# Patient Record
Sex: FEMALE | Race: OTHER | HISPANIC OR LATINO | URBAN - METROPOLITAN AREA
[De-identification: names, ages, dates, MRNs, and addresses within clinical notes are randomized per-mention and may not be internally consistent; named-entity substitution may affect disease eponyms.]

---
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
## 2024-07-03 PROBLEM — Z78.9 OTHER SPECIFIED HEALTH STATUS: Chronic | Status: ACTIVE | Noted: 2022-10-10

## 2024-09-24 ENCOUNTER — OUTPATIENT (OUTPATIENT)
Dept: OUTPATIENT SERVICES | Facility: HOSPITAL | Age: 24
LOS: 1 days | End: 2024-09-24
Payer: MEDICAID

## 2024-09-24 DIAGNOSIS — Z98.891 HISTORY OF UTERINE SCAR FROM PREVIOUS SURGERY: Chronic | ICD-10-CM

## 2024-09-24 DIAGNOSIS — Z01.818 ENCOUNTER FOR OTHER PREPROCEDURAL EXAMINATION: ICD-10-CM

## 2024-09-24 DIAGNOSIS — Z3A.38 38 WEEKS GESTATION OF PREGNANCY: ICD-10-CM

## 2024-09-24 LAB
BASOPHILS # BLD AUTO: 0.02 K/UL — SIGNIFICANT CHANGE UP (ref 0–0.2)
BASOPHILS NFR BLD AUTO: 0.2 % — SIGNIFICANT CHANGE UP (ref 0–2)
EOSINOPHIL # BLD AUTO: 0.06 K/UL — SIGNIFICANT CHANGE UP (ref 0–0.5)
EOSINOPHIL NFR BLD AUTO: 0.6 % — SIGNIFICANT CHANGE UP (ref 0–6)
HCT VFR BLD CALC: 35.2 % — SIGNIFICANT CHANGE UP (ref 34.5–45)
HGB BLD-MCNC: 11.3 G/DL — LOW (ref 11.5–15.5)
IMM GRANULOCYTES NFR BLD AUTO: 0.4 % — SIGNIFICANT CHANGE UP (ref 0–0.9)
LYMPHOCYTES # BLD AUTO: 1.24 K/UL — SIGNIFICANT CHANGE UP (ref 1–3.3)
LYMPHOCYTES # BLD AUTO: 13 % — SIGNIFICANT CHANGE UP (ref 13–44)
MCHC RBC-ENTMCNC: 26.5 PG — LOW (ref 27–34)
MCHC RBC-ENTMCNC: 32.1 GM/DL — SIGNIFICANT CHANGE UP (ref 32–36)
MCV RBC AUTO: 82.4 FL — SIGNIFICANT CHANGE UP (ref 80–100)
MONOCYTES # BLD AUTO: 0.51 K/UL — SIGNIFICANT CHANGE UP (ref 0–0.9)
MONOCYTES NFR BLD AUTO: 5.3 % — SIGNIFICANT CHANGE UP (ref 2–14)
NEUTROPHILS # BLD AUTO: 7.67 K/UL — HIGH (ref 1.8–7.4)
NEUTROPHILS NFR BLD AUTO: 80.5 % — HIGH (ref 43–77)
PLATELET # BLD AUTO: 303 K/UL — SIGNIFICANT CHANGE UP (ref 150–400)
RBC # BLD: 4.27 M/UL — SIGNIFICANT CHANGE UP (ref 3.8–5.2)
RBC # FLD: 15 % — HIGH (ref 10.3–14.5)
WBC # BLD: 9.54 K/UL — SIGNIFICANT CHANGE UP (ref 3.8–10.5)
WBC # FLD AUTO: 9.54 K/UL — SIGNIFICANT CHANGE UP (ref 3.8–10.5)

## 2024-09-24 PROCEDURE — 86900 BLOOD TYPING SEROLOGIC ABO: CPT

## 2024-09-24 PROCEDURE — 36415 COLL VENOUS BLD VENIPUNCTURE: CPT

## 2024-09-24 PROCEDURE — 86901 BLOOD TYPING SEROLOGIC RH(D): CPT

## 2024-09-24 PROCEDURE — 85025 COMPLETE CBC W/AUTO DIFF WBC: CPT

## 2024-09-24 PROCEDURE — 86850 RBC ANTIBODY SCREEN: CPT

## 2024-09-25 DIAGNOSIS — Z3A.38 38 WEEKS GESTATION OF PREGNANCY: ICD-10-CM

## 2024-09-25 DIAGNOSIS — Z01.818 ENCOUNTER FOR OTHER PREPROCEDURAL EXAMINATION: ICD-10-CM

## 2024-09-26 ENCOUNTER — INPATIENT (INPATIENT)
Facility: HOSPITAL | Age: 24
LOS: 2 days | Discharge: HOME | DRG: 540 | End: 2024-09-29
Attending: OBSTETRICS & GYNECOLOGY | Admitting: OBSTETRICS & GYNECOLOGY
Payer: MEDICAID

## 2024-09-26 VITALS
SYSTOLIC BLOOD PRESSURE: 124 MMHG | RESPIRATION RATE: 18 BRPM | HEIGHT: 67 IN | HEART RATE: 94 BPM | TEMPERATURE: 98 F | WEIGHT: 278 LBS | DIASTOLIC BLOOD PRESSURE: 79 MMHG

## 2024-09-26 DIAGNOSIS — Z3A.38 38 WEEKS GESTATION OF PREGNANCY: ICD-10-CM

## 2024-09-26 DIAGNOSIS — Z98.891 HISTORY OF UTERINE SCAR FROM PREVIOUS SURGERY: Chronic | ICD-10-CM

## 2024-09-26 DIAGNOSIS — Z01.818 ENCOUNTER FOR OTHER PREPROCEDURAL EXAMINATION: ICD-10-CM

## 2024-09-26 LAB — T PALLIDUM AB TITR SER: NEGATIVE — SIGNIFICANT CHANGE UP

## 2024-09-26 PROCEDURE — 94760 N-INVAS EAR/PLS OXIMETRY 1: CPT

## 2024-09-26 PROCEDURE — 85025 COMPLETE CBC W/AUTO DIFF WBC: CPT

## 2024-09-26 PROCEDURE — 36415 COLL VENOUS BLD VENIPUNCTURE: CPT

## 2024-09-26 PROCEDURE — 88302 TISSUE EXAM BY PATHOLOGIST: CPT

## 2024-09-26 PROCEDURE — 59050 FETAL MONITOR W/REPORT: CPT

## 2024-09-26 PROCEDURE — 88302 TISSUE EXAM BY PATHOLOGIST: CPT | Mod: 26

## 2024-09-26 PROCEDURE — 86780 TREPONEMA PALLIDUM: CPT

## 2024-09-26 PROCEDURE — 12345F: CUSTOM | Mod: NC

## 2024-09-26 RX ORDER — OXYCODONE HYDROCHLORIDE 30 MG/1
5 TABLET, FILM COATED, EXTENDED RELEASE ORAL
Refills: 0 | Status: COMPLETED | OUTPATIENT
Start: 2024-09-26 | End: 2024-10-03

## 2024-09-26 RX ORDER — SODIUM CITRATE AND CITRIC ACID MONOHYDRATE 334; 500 MG/5ML; MG/5ML
30 SOLUTION ORAL ONCE
Refills: 0 | Status: COMPLETED | OUTPATIENT
Start: 2024-09-26 | End: 2024-09-26

## 2024-09-26 RX ORDER — OXYTOCIN/RINGER'S LACTATE 20/500ML
167 PLASTIC BAG, INJECTION (ML) INTRAVENOUS
Qty: 30 | Refills: 0 | Status: DISCONTINUED | OUTPATIENT
Start: 2024-09-26 | End: 2024-09-29

## 2024-09-26 RX ORDER — TETANUS TOXOID, REDUCED DIPHTHERIA TOXOID AND ACELLULAR PERTUSSIS VACCINE, ADSORBED 5; 2.5; 8; 8; 2.5 [IU]/.5ML; [IU]/.5ML; UG/.5ML; UG/.5ML; UG/.5ML
0.5 SUSPENSION INTRAMUSCULAR ONCE
Refills: 0 | Status: DISCONTINUED | OUTPATIENT
Start: 2024-09-26 | End: 2024-09-29

## 2024-09-26 RX ORDER — ACETAMINOPHEN 325 MG
1000 TABLET ORAL ONCE
Refills: 0 | Status: COMPLETED | OUTPATIENT
Start: 2024-09-26 | End: 2024-09-26

## 2024-09-26 RX ORDER — DIPHENHYDRAMINE HCL 12.5MG/5ML
25 LIQUID (ML) ORAL EVERY 6 HOURS
Refills: 0 | Status: DISCONTINUED | OUTPATIENT
Start: 2024-09-26 | End: 2024-09-29

## 2024-09-26 RX ORDER — FAMOTIDINE 40 MG
20 TABLET ORAL ONCE
Refills: 0 | Status: COMPLETED | OUTPATIENT
Start: 2024-09-26 | End: 2024-09-26

## 2024-09-26 RX ORDER — CHLORHEXIDINE GLUCONATE ORAL RINSE 1.2 MG/ML
1 SOLUTION DENTAL DAILY
Refills: 0 | Status: DISCONTINUED | OUTPATIENT
Start: 2024-09-26 | End: 2024-09-26

## 2024-09-26 RX ORDER — ACETAMINOPHEN 325 MG
975 TABLET ORAL
Refills: 0 | Status: DISCONTINUED | OUTPATIENT
Start: 2024-09-26 | End: 2024-09-29

## 2024-09-26 RX ORDER — OXYTOCIN/RINGER'S LACTATE 20/500ML
42 PLASTIC BAG, INJECTION (ML) INTRAVENOUS
Qty: 30 | Refills: 0 | Status: DISCONTINUED | OUTPATIENT
Start: 2024-09-26 | End: 2024-09-29

## 2024-09-26 RX ORDER — SODIUM CHLORIDE IRRIG SOLUTION 0.9 %
1000 SOLUTION, IRRIGATION IRRIGATION
Refills: 0 | Status: DISCONTINUED | OUTPATIENT
Start: 2024-09-26 | End: 2024-09-29

## 2024-09-26 RX ORDER — ENOXAPARIN SODIUM 150 MG/ML
40 INJECTION SUBCUTANEOUS EVERY 24 HOURS
Refills: 0 | Status: DISCONTINUED | OUTPATIENT
Start: 2024-09-26 | End: 2024-09-29

## 2024-09-26 RX ORDER — KETOROLAC TROMETHAMINE 10 MG/1
30 TABLET, FILM COATED ORAL EVERY 6 HOURS
Refills: 0 | Status: DISCONTINUED | OUTPATIENT
Start: 2024-09-26 | End: 2024-09-27

## 2024-09-26 RX ORDER — SODIUM CHLORIDE IRRIG SOLUTION 0.9 %
1000 SOLUTION, IRRIGATION IRRIGATION
Refills: 0 | Status: DISCONTINUED | OUTPATIENT
Start: 2024-09-26 | End: 2024-09-26

## 2024-09-26 RX ORDER — MAGNESIUM HYDROXIDE 400 MG/5ML
30 SUSPENSION, ORAL (FINAL DOSE FORM) ORAL
Refills: 0 | Status: DISCONTINUED | OUTPATIENT
Start: 2024-09-26 | End: 2024-09-29

## 2024-09-26 RX ORDER — OXYCODONE HYDROCHLORIDE 30 MG/1
5 TABLET, FILM COATED, EXTENDED RELEASE ORAL ONCE
Refills: 0 | Status: DISCONTINUED | OUTPATIENT
Start: 2024-09-26 | End: 2024-09-29

## 2024-09-26 RX ADMIN — Medication 975 MILLIGRAM(S): at 18:30

## 2024-09-26 RX ADMIN — SODIUM CITRATE AND CITRIC ACID MONOHYDRATE 30 MILLILITER(S): 334; 500 SOLUTION ORAL at 07:41

## 2024-09-26 RX ADMIN — KETOROLAC TROMETHAMINE 30 MILLIGRAM(S): 10 TABLET, FILM COATED ORAL at 15:53

## 2024-09-26 RX ADMIN — Medication 200 MILLIGRAM(S): at 09:23

## 2024-09-26 RX ADMIN — ENOXAPARIN SODIUM 40 MILLIGRAM(S): 150 INJECTION SUBCUTANEOUS at 22:00

## 2024-09-26 RX ADMIN — Medication 975 MILLIGRAM(S): at 20:00

## 2024-09-26 RX ADMIN — Medication 42 MILLIUNIT(S)/MIN: at 10:53

## 2024-09-26 RX ADMIN — Medication 400 MILLIGRAM(S): at 11:56

## 2024-09-26 RX ADMIN — KETOROLAC TROMETHAMINE 30 MILLIGRAM(S): 10 TABLET, FILM COATED ORAL at 17:14

## 2024-09-26 RX ADMIN — Medication 20 MILLIGRAM(S): at 07:40

## 2024-09-26 NOTE — OB PROVIDER DELIVERY SUMMARY - NSSELHIDDEN_OBGYN_ALL_OB_FT
[NS_DeliveryAttending1_OBGYN_ALL_OB_FT:[id number]==],[NS_DeliveryAssist1_OBGYN_ALL_OB_FT:[id number]=],[NS_DeliveryRN_OBGYN_ALL_OB_FT:[id number]=]

## 2024-09-26 NOTE — OB RN DELIVERY SUMMARY - NSSELHIDDEN_OBGYN_ALL_OB_FT
[NS_DeliveryAttending1_OBGYN_ALL_OB_FT:[id number]==],[NS_DeliveryAssist1_OBGYN_ALL_OB_FT:[id number]=],[NS_DeliveryRN_OBGYN_ALL_OB_FT:[id number]=]

## 2024-09-26 NOTE — OB RN PATIENT PROFILE - DOES PATIENT HAVE ADVANCE DIRECTIVE
Message left for patient regarding her Pap LSIL results.  She will need a colposcopy.  She does not have any follow up OB appointments scheduled with us.  Will route to PSRs to call.   No

## 2024-09-26 NOTE — OB RN DELIVERY SUMMARY - NS_EXTRAMURALDEL_OBGYN_ALL_OB
Eliseo Ferreira,   Your TSH is low normal. If you feel good let's continue same dose, I will send refills. But let's recheck in 3 months, lab only. I want to make sure we are not suppressing your thyroid. Kidney function looks good.   Thank you,  FARIBA Maloney, NP-C  [facility]  
No

## 2024-09-26 NOTE — OB RN INTRAOPERATIVE NOTE - NSSELHIDDEN_OBGYN_ALL_OB_FT
[NS_DeliveryAttending1_OBGYN_ALL_OB_FT:[id number]==],[NS_DeliveryAssist1_OBGYN_ALL_OB_FT:[id number]=],[NS_DeliveryRN_OBGYN_ALL_OB_FT:[id number]=]

## 2024-09-26 NOTE — OB RN PATIENT PROFILE - VISION (WITH CORRECTIVE LENSES IF THE PATIENT USUALLY WEARS THEM):
pt wearing contacts/Severely impaired: cannot locate objects without hearing or touching them or patient nonresponsive.

## 2024-09-26 NOTE — OB PROVIDER H&P - ATTENDING COMMENTS
Pt seen and examined. Agree with note above.  Pt desires permanent sterilization with partial or full salpingectomy.  Pt understands potential of complications related to adhesive disease during c/section and also possible inability to perform salpingectomy.  All r / b / a disc with the patient.  Consents signed and all questions answered.    MERVIN

## 2024-09-26 NOTE — OB PROVIDER DELIVERY SUMMARY - NSPROVIDERDELIVERYNOTE_OBGYN_ALL_OB_FT
Brief  Delivery Summary    Procedure: rLTCS +BS for prior cs  Findings: Viable male infant, apgars 9/9, cephalic presentation. Grossly normal uterus, fallopian tubes and ovaries.  Fallopian tubes removed in full  Single layer uterine closure with monocryl  subcutaneous with vicryl  SubQ skin closure with monocryl  EBL: 400  UOP: 200  Fluids in OR: 2L  Complications: None

## 2024-09-26 NOTE — OB PROVIDER H&P - ASSESSMENT
A/P: 23y  at 39w1d GA by LMP consistent with 1st trimester sono who presents to L&D for repeat c/s + BS.  -Admit to L&D  -Consent  -Admission labs  -NPO  -IV fluids  -rCS: tubal consent signed in clinic. 2 U on hold, 2IV, TXA pre op  -Fetus: reactive tracing. Continuous toco and fetal monitoring.   -GBS: Negative, no GBS ppx required   -Analgesia: spinal in OR    Discussed with Dr. Pugh

## 2024-09-26 NOTE — OB RN DELIVERY SUMMARY - NS_SEPSISRSKCALC_OBGYN_ALL_OB_FT
EOS calculated successfully. EOS Risk Factor: 0.5/1000 live births (Formerly named [facility] national incidence); GA=39w1d; Temp=98.2; ROM=0.017; GBS='Negative'; Antibiotics='No antibiotics or any antibiotics < 2 hrs prior to birth'

## 2024-09-26 NOTE — OB RN PATIENT PROFILE - BABY SUPPLIES, OB PROFILE
----- Message from Lily Phan sent at 5/30/2017  7:34 AM CDT -----  Contact: eliane Prajapati earlier appt   Teaches  tues and francescaurs   10 to 11  appt was changed   Call back       Pt contacted & appointment rescheduled   
none

## 2024-09-26 NOTE — OB PROVIDER H&P - HISTORY OF PRESENT ILLNESS
23y  at 39w1d GA by LMP consistent with 1st trimester sono who presents to L&D for repeat c/s + BS. Patient denies vaginal bleeding, contractions and leakage of fluid. She endorses good fetal movement. Denies fevers, chills, nausea, vomiting, chest pain, SOB, dizziness and headache. No other complaints at this time.   JONATHAN: 22  LMP: 22    Prenatal course is significant for:  prior c/s x3  NIPS + DiGeorge syndrome, declined amniocentesis, normal fetal MRI and echo    POB:  c/s x3  PGYN: -fibroids, -ovarian cysts, denies STD hx, denies abnormal PAPs   PMH: obesity  PSH: c/s x3  SH: Denies EtOH, tobacco and illicit drug use during this pregnancy; feels safe at home   Meds: PNVs, iron  Allergies: NKDA    Sono: vertex, anterior placenta  EFW: 3600    T(C): 36.8 (24 @ 05:56), Max: 36.8 (24 @ 05:56)  HR: 94 (24 @ 05:56) (94 - 94)  BP: 124/79 (24 @ 05:56) (124/79 - 124/79)  RR: 18 (24 @ 05:56) (18 - 18)  SpO2: --    Gen: NAD, well-appearing, AAOx3   Abd: Soft, gravid  Ext: non-tender, non-edematous  SSE: defer  SVE:  defer  Bedside sono: vertex  FHT: baseline 130, moderate variability, +accels, -decels   [place]: no ctx seen

## 2024-09-26 NOTE — OB RN PATIENT PROFILE - NS_SOURCEOFINFO_OBGYN_ALL_OB
Patient's antihypertensives were discontinued after previous hospital stay due to normotension    · Continue to monitor  · Will continue midodrine for blood pressure support Patient

## 2024-09-27 LAB
BASOPHILS # BLD AUTO: 0.04 K/UL — SIGNIFICANT CHANGE UP (ref 0–0.2)
BASOPHILS NFR BLD AUTO: 0.4 % — SIGNIFICANT CHANGE UP (ref 0–2)
EOSINOPHIL # BLD AUTO: 0.03 K/UL — SIGNIFICANT CHANGE UP (ref 0–0.5)
EOSINOPHIL NFR BLD AUTO: 0.3 % — SIGNIFICANT CHANGE UP (ref 0–6)
HCT VFR BLD CALC: 30.4 % — LOW (ref 34.5–45)
HGB BLD-MCNC: 9.8 G/DL — LOW (ref 11.5–15.5)
IMM GRANULOCYTES NFR BLD AUTO: 0.5 % — SIGNIFICANT CHANGE UP (ref 0–0.9)
LYMPHOCYTES # BLD AUTO: 1.56 K/UL — SIGNIFICANT CHANGE UP (ref 1–3.3)
LYMPHOCYTES # BLD AUTO: 15.1 % — SIGNIFICANT CHANGE UP (ref 13–44)
MCHC RBC-ENTMCNC: 26.6 PG — LOW (ref 27–34)
MCHC RBC-ENTMCNC: 32.2 GM/DL — SIGNIFICANT CHANGE UP (ref 32–36)
MCV RBC AUTO: 82.6 FL — SIGNIFICANT CHANGE UP (ref 80–100)
MONOCYTES # BLD AUTO: 0.69 K/UL — SIGNIFICANT CHANGE UP (ref 0–0.9)
MONOCYTES NFR BLD AUTO: 6.7 % — SIGNIFICANT CHANGE UP (ref 2–14)
NEUTROPHILS # BLD AUTO: 7.95 K/UL — HIGH (ref 1.8–7.4)
NEUTROPHILS NFR BLD AUTO: 77 % — SIGNIFICANT CHANGE UP (ref 43–77)
PLATELET # BLD AUTO: 272 K/UL — SIGNIFICANT CHANGE UP (ref 150–400)
RBC # BLD: 3.68 M/UL — LOW (ref 3.8–5.2)
RBC # FLD: 14.9 % — HIGH (ref 10.3–14.5)
WBC # BLD: 10.32 K/UL — SIGNIFICANT CHANGE UP (ref 3.8–10.5)
WBC # FLD AUTO: 10.32 K/UL — SIGNIFICANT CHANGE UP (ref 3.8–10.5)

## 2024-09-27 RX ORDER — ACETAMINOPHEN 325 MG
2 TABLET ORAL
Qty: 112 | Refills: 0
Start: 2024-09-27 | End: 2024-10-10

## 2024-09-27 RX ADMIN — KETOROLAC TROMETHAMINE 30 MILLIGRAM(S): 10 TABLET, FILM COATED ORAL at 07:00

## 2024-09-27 RX ADMIN — Medication 975 MILLIGRAM(S): at 21:30

## 2024-09-27 RX ADMIN — KETOROLAC TROMETHAMINE 30 MILLIGRAM(S): 10 TABLET, FILM COATED ORAL at 05:59

## 2024-09-27 RX ADMIN — Medication 600 MILLIGRAM(S): at 12:31

## 2024-09-27 RX ADMIN — ENOXAPARIN SODIUM 40 MILLIGRAM(S): 150 INJECTION SUBCUTANEOUS at 21:30

## 2024-09-27 RX ADMIN — Medication 975 MILLIGRAM(S): at 09:42

## 2024-09-27 RX ADMIN — Medication 975 MILLIGRAM(S): at 16:28

## 2024-09-27 RX ADMIN — Medication 600 MILLIGRAM(S): at 14:53

## 2024-09-27 RX ADMIN — Medication 600 MILLIGRAM(S): at 17:53

## 2024-09-27 RX ADMIN — Medication 975 MILLIGRAM(S): at 11:42

## 2024-09-27 RX ADMIN — Medication 975 MILLIGRAM(S): at 14:58

## 2024-09-27 NOTE — DISCHARGE NOTE OB - CARE PLAN
1 Principal Discharge DX:	 delivery delivered  Assessment and plan of treatment:	Patient underwent a  delivery. Post-op course was uncomplicated. Pain is well controlled with PRN medication. She has no difficulty with ambulation, voiding, or PO intake. Lab values and vital signs are within normal limits prior to discharge.  1) Please take acetaminophen and ibuprofen as needed for pain as prescribed.  2) Nothing in the vagina for 6 weeks (including no sex, no tampons, and no douching).  3) Please call your doctor for a follow up incision check in 1-2 weeks and postpartum appointment in 4-6 weeks.  4) Please continue taking vitamins postpartum. Take iron and colace for acute blood loss anemia.  5) Please call the office sooner if you have heavy vaginal bleeding, severe abdominal pain, or fever > 100.4F.  6) You may resume regular daily activity as tolerated.

## 2024-09-27 NOTE — DISCHARGE NOTE OB - PLAN OF CARE
Patient underwent a  delivery. Post-op course was uncomplicated. Pain is well controlled with PRN medication. She has no difficulty with ambulation, voiding, or PO intake. Lab values and vital signs are within normal limits prior to discharge.  1) Please take acetaminophen and ibuprofen as needed for pain as prescribed.  2) Nothing in the vagina for 6 weeks (including no sex, no tampons, and no douching).  3) Please call your doctor for a follow up incision check in 1-2 weeks and postpartum appointment in 4-6 weeks.  4) Please continue taking vitamins postpartum. Take iron and colace for acute blood loss anemia.  5) Please call the office sooner if you have heavy vaginal bleeding, severe abdominal pain, or fever > 100.4F.  6) You may resume regular daily activity as tolerated.

## 2024-09-27 NOTE — DISCHARGE NOTE OB - PATIENT PORTAL LINK FT
You can access the FollowMyHealth Patient Portal offered by [facility] by registering at the following website: [email or web address]. By joining [employer]’s FollowMyHealth portal, you will also be able to view your health information using other applications (apps) compatible with our system.

## 2024-09-27 NOTE — DISCHARGE NOTE OB - CARE PROVIDER_API CALL
Daina Malcolm  Obstetrics and Gynecology  [address], [address]  [place] [address]  Phone: [phone]  Fax: [phone]  Follow Up Time: 1 week

## 2024-09-27 NOTE — DISCHARGE NOTE OB - MEDICATION SUMMARY - MEDICATIONS TO TAKE
I will START or STAY ON the medications listed below when I get home from the hospital:    ibuprofen 600 mg oral tablet  -- 1 tab(s) by mouth every 6 hours  -- Indication: For for moderate pain     Tylenol 325 mg oral tablet  -- 2 tab(s) by mouth every 6 hours  -- Indication: For for mild pain     Prenatal 1 oral capsule  -- orally once a day  -- Indication: For continue while breastfeeding   I will START or STAY ON the medications listed below when I get home from the hospital:    ibuprofen 600 mg oral tablet  -- 1 tab(s) by mouth every 6 hours  -- Indication: For for moderate pain     Tylenol 325 mg oral tablet  -- 2 tab(s) by mouth every 6 hours  -- Indication: For for mild pain     oxyCODONE 5 mg oral tablet  -- 1 tab(s) by mouth every 8 hours MDD: 3  -- Indication: For severe pain    Prenatal 1 oral capsule  -- orally once a day  -- Indication: For continue while breastfeeding

## 2024-09-27 NOTE — DISCHARGE NOTE OB - HOSPITAL COURSE
Pt is a 22 yo F [id number] s/p repeat c section and bilateral tubal ligation. She had uncomplicated postoperative course and will be discharged in satisfactory condition. She is voiding, tolerating PO, bowel function normal, pain controlled, ambulating without difficulty. Instructions given, medications sent to pharmacy.

## 2024-09-27 NOTE — DISCHARGE NOTE OB - NS MD DC FALL RISK RISK
For information on Fall & Injury Prevention, visit: [email or web address] OR  [email or web address] OR  https://www.cdc.gov/steadi/patient.html

## 2024-09-28 RX ADMIN — Medication 975 MILLIGRAM(S): at 16:14

## 2024-09-28 RX ADMIN — Medication 600 MILLIGRAM(S): at 00:54

## 2024-09-28 RX ADMIN — Medication 975 MILLIGRAM(S): at 15:14

## 2024-09-28 RX ADMIN — Medication 600 MILLIGRAM(S): at 18:11

## 2024-09-28 RX ADMIN — Medication 975 MILLIGRAM(S): at 21:02

## 2024-09-28 RX ADMIN — Medication 975 MILLIGRAM(S): at 22:00

## 2024-09-28 RX ADMIN — Medication 600 MILLIGRAM(S): at 13:20

## 2024-09-28 RX ADMIN — ENOXAPARIN SODIUM 40 MILLIGRAM(S): 150 INJECTION SUBCUTANEOUS at 21:58

## 2024-09-28 RX ADMIN — Medication 600 MILLIGRAM(S): at 06:35

## 2024-09-28 RX ADMIN — Medication 975 MILLIGRAM(S): at 09:32

## 2024-09-28 RX ADMIN — Medication 975 MILLIGRAM(S): at 03:34

## 2024-09-28 RX ADMIN — Medication 600 MILLIGRAM(S): at 12:20

## 2024-09-28 NOTE — PROGRESS NOTE ADULT - ASSESSMENT
A/P: GEORGE RITTER is a 23y  now POD#2 s/p repeat  section at 39w gestation, uncomplicated. S/p tubal ligation.     -Vital signs stable  -Hgb: 11.3>9.8   -Voiding, tolerating PO, bowel function nml   -Advance care as tolerated   -Continue routine postpartum and postoperative care and education  -Healthy male infant  -Dispo: Patient to be discharged when meeting all postpartum and postoperative milestones and pending attending approval.

## 2024-09-28 NOTE — PROGRESS NOTE ADULT - ATTENDING COMMENTS
MD 1 Note    Pt seen and examined.  Agree with note above.  Pt doing well and is stable.  Routine PP care.  Incision c / d / I,   Desires circumcision for .      PLEE
Agree with assessment and plan as above.

## 2024-09-29 VITALS
RESPIRATION RATE: 17 BRPM | SYSTOLIC BLOOD PRESSURE: 129 MMHG | OXYGEN SATURATION: 98 % | TEMPERATURE: 98 F | HEART RATE: 84 BPM | DIASTOLIC BLOOD PRESSURE: 68 MMHG

## 2024-09-29 RX ORDER — OXYCODONE HYDROCHLORIDE 30 MG/1
5 TABLET, FILM COATED, EXTENDED RELEASE ORAL
Refills: 0 | Status: DISCONTINUED | OUTPATIENT
Start: 2024-09-29 | End: 2024-09-29

## 2024-09-29 RX ORDER — OXYCODONE HYDROCHLORIDE 30 MG/1
1 TABLET, FILM COATED, EXTENDED RELEASE ORAL
Qty: 6 | Refills: 0
Start: 2024-09-29 | End: 2024-09-30

## 2024-09-29 RX ADMIN — Medication 975 MILLIGRAM(S): at 14:40

## 2024-09-29 RX ADMIN — Medication 600 MILLIGRAM(S): at 00:09

## 2024-09-29 RX ADMIN — OXYCODONE HYDROCHLORIDE 5 MILLIGRAM(S): 30 TABLET, FILM COATED, EXTENDED RELEASE ORAL at 06:56

## 2024-09-29 RX ADMIN — Medication 975 MILLIGRAM(S): at 15:00

## 2024-09-29 RX ADMIN — Medication 600 MILLIGRAM(S): at 11:41

## 2024-09-29 RX ADMIN — Medication 975 MILLIGRAM(S): at 09:00

## 2024-09-29 RX ADMIN — Medication 600 MILLIGRAM(S): at 12:00

## 2024-09-29 RX ADMIN — Medication 600 MILLIGRAM(S): at 06:29

## 2024-09-29 RX ADMIN — Medication 975 MILLIGRAM(S): at 08:40

## 2024-09-29 RX ADMIN — Medication 600 MILLIGRAM(S): at 01:00

## 2024-09-29 NOTE — PROGRESS NOTE ADULT - SUBJECTIVE AND OBJECTIVE BOX
GEORGE RITTER is a 23y  now POD#1 s/p repeat  section at 39w gestation, uncomplicated. S/p tubal ligation.    S:    No acute events overnight.   The patient has no complaints.  Pain controlled with current treatment regimen.   She is ambulating without difficulty and tolerating PO.   - flatus/-BM/+ voiding   She endorses appropriate lochia, which is decreasing.   She is breastfeeding without difficulty.   She denies fevers, chills, nausea and vomiting.   She denies lightheadedness, dizziness, palpitations, chest pain and SOB.     O:    T(C): 36.4 (24 @ 04:00), Max: 37 (24 @ 00:10)  HR: 70 (24 @ 04:00) (67 - 96)  BP: 109/57 (24 @ 04:00) (105/90 - 144/98)  RR: 16 (24 @ 04:00) (14 - 20)  SpO2: 98% (24 @ 04:00) (98% - 100%)    Gen: NAD, AOx3  CV: RRR, S1/S2 present  Pulm: CTAB  Breast: Nontender, non-engorged   Abdomen:  Soft, non-tender, non-distended, +bowel sounds  Incision: Clean/dry/intact with dressing in place  Uterus:  Fundus firm below umbilicus  VE:  Expectant lochia  Ext:  Non-tender and non-edematous                          9.8    10.32 )-----------( 272      ( 27 Sep 2024 06:26 )             30.4       A/P:    -Vital signs stable  -Hgb: 11.3>9.8   -Voiding, tolerating PO, bowel function nml   -Advance care as tolerated   -Continue routine postpartum and postoperative care and education  -Healthy male infant  -Dispo: Patient to be discharged when meeting all postpartum and postoperative milestones and pending attending approval.    Case dw Dr. Reid
PO #3  Pt without complaints +flatus +BM  ICU Vital Signs Last 24 Hrs  T(C): 37 (29 Sep 2024 04:17), Max: 37 (29 Sep 2024 04:17)  T(F): 98.6 (29 Sep 2024 04:17), Max: 98.6 (29 Sep 2024 04:17)  HR: 81 (29 Sep 2024 04:17) (81 - 87)  BP: 110/52 (29 Sep 2024 04:17) (110/52 - 124/70)  BP(mean): --  ABP: --  ABP(mean): --  RR: 18 (29 Sep 2024 04:17) (17 - 18)  SpO2: 99% (29 Sep 2024 04:17) (98% - 100%)    abdomen soft, utx firm/NT. incision C/D/I   light lochia  extremities 3+ edema, NT    A/P s/p R C/S, doing well. Discharge home.      
GEORGE RITTER is a 23y  now POD#2 s/p repeat  section at 39w gestation, uncomplicated. S/p tubal ligation.    S:    No acute events overnight.   The patient has no complaints.  Pain controlled with current treatment regimen.   She is ambulating without difficulty and tolerating PO.   - flatus/-BM/+ voiding   She endorses appropriate lochia, which is decreasing.   She is breastfeeding without difficulty.   She denies fevers, chills, nausea and vomiting.   She denies lightheadedness, dizziness, palpitations, chest pain and SOB.     O:    Vital Signs Last 24 Hrs  T(C): 36.9 (28 Sep 2024 00:40), Max: 36.9 (27 Sep 2024 08:10)  T(F): 98.4 (28 Sep 2024 00:40), Max: 98.4 (27 Sep 2024 08:10)  HR: 80 (28 Sep 2024 00:40) (77 - 80)  BP: 128/70 (28 Sep 2024 00:40) (115/63 - 128/70)  BP(mean): --  RR: 18 (28 Sep 2024 00:40) (18 - 18)  SpO2: 100% (28 Sep 2024 00:40) (98% - 100%)    Parameters below as of 28 Sep 2024 00:40  Patient On (Oxygen Delivery Method): room air      Gen: NAD, AOx3  CV: RRR, S1/S2 present  Pulm: CTAB  Breast: Nontender, non-engorged   Abdomen:  Soft, non-tender, non-distended, +bowel sounds  Incision: Clean/dry/intact with dressing in place  Uterus:  Fundus firm below umbilicus  VE:  Expectant lochia  Ext:  Non-tender and non-edematous                                    9.8    10.32 )-----------( 272      ( 27 Sep 2024 06:26 )             30.4

## 2024-09-30 LAB — SURGICAL PATHOLOGY STUDY: SIGNIFICANT CHANGE UP

## 2024-10-03 DIAGNOSIS — Z3A.39 39 WEEKS GESTATION OF PREGNANCY: ICD-10-CM

## 2024-10-03 DIAGNOSIS — Z30.2 ENCOUNTER FOR STERILIZATION: ICD-10-CM

## 2024-10-03 DIAGNOSIS — O34.211 MATERNAL CARE FOR LOW TRANSVERSE SCAR FROM PREVIOUS CESAREAN DELIVERY: ICD-10-CM

## 2024-10-03 DIAGNOSIS — Z28.09 IMMUNIZATION NOT CARRIED OUT BECAUSE OF OTHER CONTRAINDICATION: ICD-10-CM

## 2024-10-24 ENCOUNTER — APPOINTMENT (OUTPATIENT)
Dept: INTERNAL MEDICINE | Facility: CLINIC | Age: 24
End: 2024-10-24

## 2024-11-05 ENCOUNTER — APPOINTMENT (OUTPATIENT)
Dept: INTERNAL MEDICINE | Facility: CLINIC | Age: 24
End: 2024-11-05